# Patient Record
Sex: MALE | Race: BLACK OR AFRICAN AMERICAN | URBAN - METROPOLITAN AREA
[De-identification: names, ages, dates, MRNs, and addresses within clinical notes are randomized per-mention and may not be internally consistent; named-entity substitution may affect disease eponyms.]

---

## 2023-12-05 ENCOUNTER — OFFICE VISIT (OUTPATIENT)
Dept: URBAN - METROPOLITAN AREA CLINIC 74 | Facility: CLINIC | Age: 43
End: 2023-12-05

## 2023-12-07 ENCOUNTER — LAB OUTSIDE AN ENCOUNTER (OUTPATIENT)
Dept: URBAN - METROPOLITAN AREA CLINIC 74 | Facility: CLINIC | Age: 43
End: 2023-12-07

## 2023-12-07 ENCOUNTER — OFFICE VISIT (OUTPATIENT)
Dept: URBAN - METROPOLITAN AREA CLINIC 74 | Facility: CLINIC | Age: 43
End: 2023-12-07
Payer: COMMERCIAL

## 2023-12-07 VITALS
SYSTOLIC BLOOD PRESSURE: 110 MMHG | WEIGHT: 156 LBS | BODY MASS INDEX: 23.11 KG/M2 | OXYGEN SATURATION: 98 % | TEMPERATURE: 97.9 F | DIASTOLIC BLOOD PRESSURE: 80 MMHG | HEIGHT: 69 IN | HEART RATE: 89 BPM

## 2023-12-07 DIAGNOSIS — R17 JAUNDICE: ICD-10-CM

## 2023-12-07 DIAGNOSIS — R60.0 PERIPHERAL EDEMA: ICD-10-CM

## 2023-12-07 DIAGNOSIS — R18.8 OTHER ASCITES: ICD-10-CM

## 2023-12-07 DIAGNOSIS — K42.9 UMBILICAL HERNIA WITHOUT OBSTRUCTION AND WITHOUT GANGRENE: ICD-10-CM

## 2023-12-07 PROBLEM — 396347007: Status: ACTIVE | Noted: 2023-12-07

## 2023-12-07 PROBLEM — 18165001: Status: ACTIVE | Noted: 2023-12-07

## 2023-12-07 PROBLEM — 389026000: Status: ACTIVE | Noted: 2023-12-07

## 2023-12-07 PROBLEM — 271809000: Status: ACTIVE | Noted: 2023-12-07

## 2023-12-07 PROCEDURE — 99204 OFFICE O/P NEW MOD 45 MIN: CPT | Performed by: INTERNAL MEDICINE

## 2023-12-07 NOTE — HPI-TODAY'S VISIT:
The patient is a 43-year-old Afro-American male self-referred who presents to the office in the company of 2 [place]  officers.  The patient is currently incarcerated.The patient states that he noticed eye jaundice starting in early October, at the time he was seen by the half-way physician who also noticed jaundice in his eyes. The patient claimed that he has had intermittent dark urine and normal color stool, denies any pruritus, abdominal pain.  He has noticed an increase in his abdominal girth and peripheral edema. The patient denies any history of alcohol abuse, drug use, he usually takes a multivitamin but no other natural food supplements.  The patient does have multiple tattoos. Currently the patient denies having abdominal pain, denies having any dysphagia, odynophagia, rarely gets any heartburn, there has been no nausea, vomiting, hematemesis, melena. Bowel movements are mostly formed type IV on the Orosi scale with occasional loose stools, there has been no hematochezia and rarely any bright red blood on the toilet tissue. There is no family history of polyps or colon cancer, there is no history of pancreatic, liver cancer. Currently the patient takes no medications. The patient has been seen at the [facility] for routine care, we will obtain records for review. In the meantime the patient is getting a right upper quadrant ultrasound, CT of the abdomen and pelvis with contrast as long as the renal function allows it, The patient will get a CBC, CMP, alpha-fetoprotein, PT/INR, iron studies and ferritin, ceruloplasmin, hepatitis panel, AMA, KWAN, anti-smooth muscle antibodies, alpha-1 antitrypsin, protein electrophoresis. The patient has been instructed to ingest a low-sodium diet, as soon as lab and x-rays are available we will make further recommendations. The patient states that his weight dropped by about 40 pounds in 1 year but he has regained a portion of it.  The patient denied having any anorexia.

## 2023-12-07 NOTE — PHYSICAL EXAM GASTROINTESTINAL
Abdomen, soft, nontender, distended, no guarding or rigidity, no masses palpable, normal bowel sounds, Liver and Spleen,  no hepatosplenomegaly, liver nontender, umbilical hernia present, possibly ascites present.

## 2023-12-07 NOTE — PHYSICAL EXAM CONSTITUTIONAL:
well developed, well nourished , jaundiced in no acute distress , ambulating without difficulty , normal communication ability

## 2023-12-15 ENCOUNTER — TELEPHONE ENCOUNTER (OUTPATIENT)
Dept: URBAN - METROPOLITAN AREA CLINIC 74 | Facility: CLINIC | Age: 43
End: 2023-12-15

## 2023-12-16 LAB
ABSOLUTE BASOPHILS: 93
ABSOLUTE EOSINOPHILS: 149
ABSOLUTE LYMPHOCYTES: 1711
ABSOLUTE MONOCYTES: 725
ABSOLUTE NEUTROPHILS: 6622
AFP, SERUM: 2.2
AFP-L3%, SERUM: 17
ALBUMIN/GLOBULIN RATIO: 0.5
ALBUMIN: 2.3
ALBUMIN: 2.6
ALKALINE PHOSPHATASE: 272
ALPHA-1-ANTITRYPSIN (AAT) PHENOTYPE: (no result)
ALPHA-1-GLOBULIN: 0.4
ALPHA-2-GLOBULIN: 0.6
ALT: 37
ANA SCREEN, IFA: NEGATIVE
AST: 65
BASOPHILS: 1
BETA 1 GLOBULIN: 0.3
BETA 2 GLOBULIN: 0.8
BILIRUBIN, TOTAL: 13
BUN/CREATININE RATIO: 12
CALCIUM: 7.9
CARBON DIOXIDE: 24
CERULOPLASMIN: 35
CHLORIDE: 103
CREATININE: 0.59
EGFR: 124
EOSINOPHILS: 1.6
FERRITIN, SERUM: 412
FIB 4 INDEX: 2.95
GAMMA GLOBULIN: 2.8
GLOBULIN: 5.1
GLUCOSE: 124
HBSAG SCREEN: (no result)
HEMATOCRIT: 25.7
HEMOGLOBIN: 8
HEP A AB, IGM: (no result)
HEP B CORE AB, IGM: (no result)
HEPATITIS C ANTIBODY: (no result)
INR: 1.7
IRON BIND.CAP.(TIBC): 155
IRON SATURATION: 52
IRON: 81
LYMPHOCYTES: 18.4
MCH: 29.6
MCHC: 31.1
MCV: 95.2
MITOCHONDRIAL (M2) ANTIBODY: <=20
MONOCYTES: 7.8
MPV: 8.3
NEUTROPHILS: 71.2
PLATELET COUNT: 152
PLATELET COUNT: 152
POTASSIUM: 3.6
PROTEIN, TOTAL: 7.4
PROTEIN, TOTAL: 7.4
PT: 16.9
RDW: 13.4
RED BLOOD CELL COUNT: 2.7
SODIUM: 135
UREA NITROGEN (BUN): 7
WHITE BLOOD CELL COUNT: 9.3

## 2024-01-16 ENCOUNTER — TELEPHONE ENCOUNTER (OUTPATIENT)
Dept: URBAN - METROPOLITAN AREA CLINIC 74 | Facility: CLINIC | Age: 44
End: 2024-01-16

## 2024-01-22 PROBLEM — 17709002: Status: ACTIVE | Noted: 2024-01-22

## 2024-01-22 PROBLEM — 191268006: Status: ACTIVE | Noted: 2024-01-22

## 2024-01-22 PROBLEM — 34742003: Status: ACTIVE | Noted: 2024-01-22

## 2024-01-22 PROBLEM — 19943007: Status: ACTIVE | Noted: 2024-01-22

## 2024-01-22 PROBLEM — 313341008: Status: ACTIVE | Noted: 2024-01-22

## 2024-01-22 PROBLEM — 1761006: Status: ACTIVE | Noted: 2024-01-22

## 2024-01-22 PROBLEM — 365799007: Status: ACTIVE | Noted: 2024-01-22

## 2024-01-24 ENCOUNTER — OFFICE VISIT (OUTPATIENT)
Dept: URBAN - METROPOLITAN AREA CLINIC 74 | Facility: CLINIC | Age: 44
End: 2024-01-24
Payer: COMMERCIAL

## 2024-01-24 VITALS
OXYGEN SATURATION: 95 % | SYSTOLIC BLOOD PRESSURE: 118 MMHG | WEIGHT: 172 LBS | BODY MASS INDEX: 25.48 KG/M2 | HEIGHT: 69 IN | TEMPERATURE: 97.5 F | DIASTOLIC BLOOD PRESSURE: 70 MMHG | HEART RATE: 93 BPM

## 2024-01-24 DIAGNOSIS — K74.69 OTHER CIRRHOSIS OF LIVER: ICD-10-CM

## 2024-01-24 DIAGNOSIS — R17 JAUNDICE: ICD-10-CM

## 2024-01-24 DIAGNOSIS — K42.9 UMBILICAL HERNIA WITHOUT OBSTRUCTION AND WITHOUT GANGRENE: ICD-10-CM

## 2024-01-24 DIAGNOSIS — D64.9 CHRONIC ANEMIA: ICD-10-CM

## 2024-01-24 PROCEDURE — 99214 OFFICE O/P EST MOD 30 MIN: CPT | Performed by: INTERNAL MEDICINE

## 2024-01-24 NOTE — HPI-TODAY'S VISIT:
The patient is a 43-year-old Afro-American male self-referred who presents to the office in the company of 2 [place]  officers.  The patient is currently incarcerated.The patient states that he noticed eye jaundice starting in early October, at the time he was seen by the snf physician who also noticed jaundice in his eyes. The patient claimed that he has had intermittent dark urine and normal color stool, denies any pruritus, abdominal pain.  He has noticed an increase in his abdominal girth and peripheral edema. The patient denies any history of alcohol abuse, drug use, he usually takes a multivitamin but no other natural food supplements.  The patient does have multiple tattoos. Currently the patient denies having abdominal pain, denies having any dysphagia, odynophagia, rarely gets any heartburn, there has been no nausea, vomiting, hematemesis, melena. Bowel movements are mostly formed type IV on the Oto scale with occasional loose stools, there has been no hematochezia and rarely any bright red blood on the toilet tissue. There is no family history of polyps or colon cancer, there is no history of pancreatic, liver cancer. Currently the patient takes no medications. The patient has been seen at the [facility] for routine care, we will obtain records for review. In the meantime the patient is getting a right upper quadrant ultrasound, CT of the abdomen and pelvis with contrast as long as the renal function allows it, The patient will get a CBC, CMP, alpha-fetoprotein, PT/INR, iron studies and ferritin, ceruloplasmin, hepatitis panel, AMA, KWAN, anti-smooth muscle antibodies, alpha-1 antitrypsin, protein electrophoresis. The patient has been instructed to ingest a low-sodium diet, as soon as lab and x-rays are available we will make further recommendations. The patient states that his weight dropped by about 40 pounds in 1 year but he has regained a portion of it.  The patient denied having any anorexia.  Today January 24, 2024 the patient returns for a follow-up visit, the patient was last seen on December 7, 2023 with jaundice and ascites, peripheral edema and an umbilical hernia.  At the time the patient was an inmate and was found to have jaundice and dark urine, the patient denied having any fever or pruritus, there was no abdominal pain but the patient did notice an increase on his abdominal girth and peripheral edema.  There was no previous history of alcohol abuse drug use.  The patient was taking multivitamins and natural food supplements the patient had multiple tattoos. The patient deniedd having any upper GI symptoms such as dysphagia, odynophagia, rarely gets heartburn, there was no nausea vomiting hematemesis or melena. The patient was having type IV Oto scale stools with occasional loose stool.  There was no hematochezia or rectal bleeding.  There was no family history of colon cancer pancreatic or liver cancer and no history of colon polyps. In the past the patient was being followed by the [facility], records were requested for review. The patient was advised to get a right upper quadrant ultrasound CT of abdomen and pelvis with contrast as long as the renal function allowed it, a CBC, CMP, alpha-fetoprotein, PT/INR iron studies and ferritin, ceruloplasmin, hepatitis panel, AMA, KWAN, anti-smooth muscle antibodies, alpha-1 antitrypsin and protein electrophoresis.  The patient was advised to reduce the sodium ingested.  The patient did state that he had lost 40 pounds of body weight in 1 year due to anorexia. Laboratory revealed on December 7, 2023 a total iron of 81 with a slight elevation of the saturation to 52% and a low iron binding capacity 155, protein electrophoresis revealed an albumin of 2.6, high alpha-1 globulin and gammaglobulin of 2.8 low beta-1 beta-2 globulins a normal ceruloplasmin of 35, ferritin was elevated at 412.  AMA was under 20 which is normal alpha 1 antitrypsin showed a phenotype PI* MM which in most instances is normal, the alpha-fetoprotein was 2.2 with a high AFP-L3 of 17.0 which can be interpreted as an increased risk of developing hepatocellular carcinoma, the KWAN was negative, INR slightly prolonged 1.7 with a PT of 16.9 the hepatitis panel was negative for A, B, and C, the CBC revealed an anemia of 8.0 g of hemoglobin with a hematocrit of 25.7, MCHC was low at 31.1 with a low normal of 32 other indices were normal platelets 152,000 with a white cell count of 9.3 and normal differential and the CMP revealed a high fib 4 index of 2.95, ALT 37, AST 65, alkaline phosphatase 272 with a high bilirubin of 13.0 and low albumin of 2.3 and high globulins of 5.1 the CT of the abdomen and pelvis obtained on January 10, 2024 showed an enlarged liver with low density and nodular contour, the liver measures 20.4 cm a contracted gallbladder with a high density intrinsic complex which is either sludge or send no biliary ductal dilation and normal pancreas, enlarged spleen the patient did have paraesophageal varices with an unremarkable stomach, small bowel and colon there was no lymphadenopathy.  The patient had some stranding noted in the retroperitoneum adjacent to venous varices there were also other changes including mild perirectal stranding and a slightly thickened bladder. The gallbladder on an ultrasound showed suspected gallbladder sludge and a common bile duct measuring 4 mm.  Today the patient returns to the office, the patient remains jailed, he currently denies having any evidence of GI bleeding such as hematemesis or melena there is no hematochezia, according to the patient bowel movements are formed, type IV on the Oto scale with no blood.The patient denied having any abdominal pain, there is no dysphagia, odynophagia, there is no heartburn. The patient remains jaundiced and continues to have peripheral edema. I discussed with the patient all recent lab and CT findings as well as the ultrasound findings, changes are highly suggestive of liver cirrhosis with portal hypertension. So far he is aware of the slightly elevated serum iron and saturation as well as the elevated ferritin. The patient is also aware that he has changes which are highly compatible with liver cirrhosis.  The patient is currently anemic with a hemoglobin of 8.0 and a hematocrit of 25.7, we will obtain stool to check for occult blood. In the meantime in view of his age and current circumstances the patient will be referred to Dr. Bo, we will try to arrange for a telehealth visit so we can expedite his evaluation. Based on the evaluation of Dr. Bo we will proceed with further recommendations.

## 2024-02-07 ENCOUNTER — HEMO IFOBT (OUTPATIENT)
Dept: URBAN - METROPOLITAN AREA CLINIC 73 | Facility: CLINIC | Age: 44
End: 2024-02-07
Payer: COMMERCIAL

## 2024-02-07 DIAGNOSIS — K92.1 BLOOD IN STOOL: ICD-10-CM

## 2024-02-07 PROCEDURE — 82274 ASSAY TEST FOR BLOOD FECAL: CPT | Performed by: INTERNAL MEDICINE

## 2024-03-17 PROBLEM — 442618008: Status: ACTIVE | Noted: 2024-03-17

## 2024-03-26 ENCOUNTER — OV EP (OUTPATIENT)
Dept: URBAN - METROPOLITAN AREA CLINIC 86 | Facility: CLINIC | Age: 44
End: 2024-03-26

## 2025-05-04 ENCOUNTER — DASHBOARD ENCOUNTERS (OUTPATIENT)
Age: 45
End: 2025-05-04

## 2025-05-07 ENCOUNTER — TELEPHONE ENCOUNTER (OUTPATIENT)
Dept: URBAN - METROPOLITAN AREA CLINIC 86 | Facility: CLINIC | Age: 45
End: 2025-05-07

## 2025-05-07 NOTE — HPI-TODAY'S VISIT:
Chart prep  April 16 labs from local lab sent in WBC 3.7 normal hemoglobin low at 10.3 hematocrit low at 29 MCV 91 and platelet count low at 86.  Previously in January your platelet count had been 152 so that seems unusually low.   Neutrophils 1.6 lymphocytes 1.7.   Glucose was 106 elevated BUN 12 creatinine 0.79 sodium 142 best 3.7 chloride 111 CO2 of 18 calcium 8.2 albumin was low at 3.0 total bilirubin elevated at 1.4 but not fractionated.  Alk phos was 304 which was elevated and AST 53 and ALT 32.  Previously in January alk phos was 272 AST 65 ALT 37.

## 2025-05-08 ENCOUNTER — OFFICE VISIT (OUTPATIENT)
Dept: URBAN - METROPOLITAN AREA CLINIC 86 | Facility: CLINIC | Age: 45
End: 2025-05-08

## 2025-05-08 ENCOUNTER — TELEPHONE ENCOUNTER (OUTPATIENT)
Dept: URBAN - METROPOLITAN AREA CLINIC 86 | Facility: CLINIC | Age: 45
End: 2025-05-08

## 2025-05-08 NOTE — HPI-TODAY'S VISIT:
Patient is a 43-year-old male and did not show for march appt and now here for may appt who is being referred in by Dr. Pratt for evaluation of jaundice.  A copy of the note will be sent to referring provider.  For his December 2023 he was noted to have jaundice and around October 2023 and was seen by the [facility] physician who noticed that.  He also has had some intermittent dark urine and denied any pruritus or abdominal pain. He did have some increase in abdominal girth and edema.  No history of any alcohol or drug usage he does take a multivitamin. He does have multiple tattoos.  Patient denied any hematochezia and mention rarely having some bright red blood on stool movement.  No cancer history.  Previously was seen at the [facility] for care but records were not available to him or to us.  He plan to get some basic labs on the patient and to reassess the patient.  Of note patient had lost about 40 pounds in a year. Regained some.    April 16 labs from local lab sent in WBC 3.7 normal hemoglobin low at 10.3 hematocrit low at 29 MCV 91 and platelet count low at 86.  Previously in January your platelet count had been 152 so that seems unusually low.  Neutrophils 1.6 lymphocytes 1.7.  Glucose was 106 elevated BUN 12 creatinine 0.79 sodium 142 best 3.7 chloride 111 CO2 of 18 calcium 8.2 albumin was low at 3.0 total bilirubin elevated at 1.4 but not fractionated.  Alk phos was 304 which was elevated and AST 53 and ALT 32.  Previously in January alk phos was 272 AST 65 ALT 37.    At the January visit patient had reviewed labs which included an iron of 81 which was slightly up at 52% sat ceruloplasmin 35 ferritin 412 AMA negative alpha-1 MM. aFP was 2.2 but with a AFP L3 of 17 which was felt to be increased risk. KWAN was negative. INR prolonged at 1.7. Prothrombin time 16.9. A/P/C reported negative. Hemoglobin of 8 and hematocrit 25.7. Platelet count 152. WBC 9.3. Fib 4 index increased at 2.95. AST 65 ALT 37 alk phos 272 bilirubin 13 albumin 2.3.  CT scan January 10, 2024 enlarged liver with nodular contour 20.4 cm. Possible sludge in the contracted gallbladder. No bile duct dilation. Normal pancreas. Enlarged spleen. No adenopathy. Some stranding in the retroperitoneum adjacent some venous varices. Some perirectal stranding and a slightly thickened bladder.  Ultrasound suggested sludge in the common bile duct 4 mm.  January 24, 2024 patient be seen and reviewed these findings with him with Dr. Pratt.  Was referred to see us.  BMI 25.54 weight 172 height 69 inches.  [place] 10 2024:  Lower chest no evidence of abnormality.  Liver enlarged and low density and nodular contour. Liver 20.4cm.  Gallbladder contracted and high denisty intrinsic contents sludge or sand. No dilation of ducts intra or extrahepatic.  Pancreas unremarkable.  Spleen 15.6cm enlarged.  Adrenals unremarkable.  Smal low densoty lesion posterior right kidney too small to characterize and statistically a cyst No hydronephrosis.  Varices near esophagis and stomach unremarkable. Appendix unremarkable.  Prostate and seminal vesicles unremarkable.  They saw paraesophageal varices and umbilical vein recanalized and periumbilical varices and varices perirectal distribution and extend to Left RP along the left gonadal vessels.  No sig LB.   No large volume ascites.  [place] 10 2024 .s slight nodularity suspected and pacreas pooryl visualized and suspected gb sludge. CBD 4mm. Right kidney no hydronephrosis. Gb sludge and echogenic material sugg of sludge.Liver cirrhosis suspected  Plan:  1. Do labs and check meld.  2. Needs mri to be sure no infiltrating tumor,  3. Pt needs b.c screens with tattoos.  4. Need to refer to oltx team if meld above 15.  Duration of visit was  minutes with 40 minutes spent prepping chart and loading info for the visit to [facility] records and then 30 additional minutes spent for this healow telemed visit reviewing chart and events and plans with the pt.